# Patient Record
Sex: FEMALE | Race: BLACK OR AFRICAN AMERICAN | NOT HISPANIC OR LATINO | Employment: FULL TIME | URBAN - METROPOLITAN AREA
[De-identification: names, ages, dates, MRNs, and addresses within clinical notes are randomized per-mention and may not be internally consistent; named-entity substitution may affect disease eponyms.]

---

## 2018-03-12 ENCOUNTER — HOSPITAL ENCOUNTER (EMERGENCY)
Facility: HOSPITAL | Age: 63
Discharge: HOME | End: 2018-03-13
Attending: EMERGENCY MEDICINE
Payer: MEDICAID

## 2018-03-12 DIAGNOSIS — R07.9 CHEST PAIN: ICD-10-CM

## 2018-03-12 DIAGNOSIS — I10 ESSENTIAL HYPERTENSION: ICD-10-CM

## 2018-03-12 DIAGNOSIS — R07.89 CHEST WALL PAIN: Primary | ICD-10-CM

## 2018-03-12 LAB
ALBUMIN SERPL BCP-MCNC: 4.3 G/DL
ALP SERPL-CCNC: 77 U/L
ALT SERPL W/O P-5'-P-CCNC: 16 U/L
ANION GAP SERPL CALC-SCNC: 12 MMOL/L
AST SERPL-CCNC: 23 U/L
BASOPHILS # BLD AUTO: 0.02 K/UL
BASOPHILS NFR BLD: 0.3 %
BILIRUB SERPL-MCNC: 0.8 MG/DL
BUN SERPL-MCNC: 18 MG/DL
CALCIUM SERPL-MCNC: 10.2 MG/DL
CHLORIDE SERPL-SCNC: 102 MMOL/L
CO2 SERPL-SCNC: 28 MMOL/L
CREAT SERPL-MCNC: 0.8 MG/DL
DIFFERENTIAL METHOD: NORMAL
EOSINOPHIL # BLD AUTO: 0.2 K/UL
EOSINOPHIL NFR BLD: 2.3 %
ERYTHROCYTE [DISTWIDTH] IN BLOOD BY AUTOMATED COUNT: 14.4 %
EST. GFR  (AFRICAN AMERICAN): >60 ML/MIN/1.73 M^2
EST. GFR  (NON AFRICAN AMERICAN): >60 ML/MIN/1.73 M^2
GLUCOSE SERPL-MCNC: 96 MG/DL
HCT VFR BLD AUTO: 40.2 %
HGB BLD-MCNC: 13.1 G/DL
LYMPHOCYTES # BLD AUTO: 3.4 K/UL
LYMPHOCYTES NFR BLD: 43.7 %
MCH RBC QN AUTO: 29.6 PG
MCHC RBC AUTO-ENTMCNC: 32.6 G/DL
MCV RBC AUTO: 91 FL
MONOCYTES # BLD AUTO: 0.3 K/UL
MONOCYTES NFR BLD: 4.2 %
NEUTROPHILS # BLD AUTO: 3.9 K/UL
NEUTROPHILS NFR BLD: 49.5 %
PLATELET # BLD AUTO: 208 K/UL
PMV BLD AUTO: 9.8 FL
POTASSIUM SERPL-SCNC: 3.8 MMOL/L
PROT SERPL-MCNC: 8.2 G/DL
RBC # BLD AUTO: 4.43 M/UL
SODIUM SERPL-SCNC: 142 MMOL/L
TROPONIN I SERPL DL<=0.01 NG/ML-MCNC: <0.006 NG/ML
WBC # BLD AUTO: 7.85 K/UL

## 2018-03-12 PROCEDURE — 99284 EMERGENCY DEPT VISIT MOD MDM: CPT | Mod: 25

## 2018-03-12 PROCEDURE — 93010 ELECTROCARDIOGRAM REPORT: CPT | Mod: ,,, | Performed by: INTERNAL MEDICINE

## 2018-03-12 PROCEDURE — 84484 ASSAY OF TROPONIN QUANT: CPT

## 2018-03-12 PROCEDURE — 85025 COMPLETE CBC W/AUTO DIFF WBC: CPT

## 2018-03-12 PROCEDURE — 93005 ELECTROCARDIOGRAM TRACING: CPT

## 2018-03-12 PROCEDURE — 80053 COMPREHEN METABOLIC PANEL: CPT

## 2018-03-12 RX ORDER — NAPROXEN 375 MG/1
375 TABLET ORAL 2 TIMES DAILY WITH MEALS
Qty: 60 TABLET | Refills: 0 | Status: SHIPPED | OUTPATIENT
Start: 2018-03-12

## 2018-03-12 RX ORDER — ACETAMINOPHEN 500 MG
1000 TABLET ORAL
Status: DISCONTINUED | OUTPATIENT
Start: 2018-03-12 | End: 2018-03-13 | Stop reason: HOSPADM

## 2018-03-13 VITALS
HEART RATE: 65 BPM | TEMPERATURE: 98 F | OXYGEN SATURATION: 95 % | DIASTOLIC BLOOD PRESSURE: 98 MMHG | SYSTOLIC BLOOD PRESSURE: 157 MMHG | RESPIRATION RATE: 18 BRPM | WEIGHT: 186.75 LBS

## 2018-03-13 NOTE — ED PROVIDER NOTES
SCRIBE #1 NOTE: I, Lyssa Palumbo, am scribing for, and in the presence of, Bo Newton Do, MD. I have scribed the entire note.      History      Chief Complaint   Patient presents with    Chest Pain     intermittent chest pain x 1 week; not present at this time       Review of patient's allergies indicates:  No Known Allergies     HPI   HPI    3/12/2018, 9:44 PM   History obtained from the patient      History of Present Illness: Sonia Michele Cooks is a 62 y.o. female patient who presents to the Emergency Department for substernal CP which onset gradually 1 week ago when pt was lifting up a table. Pt states CP is exacerbated when bending down. Symptoms are intermittent and moderate in severity. No associated sxs included.  Patient denies any nausea, vomiting, diarrhea, wheezing, SOB, palpitations, abdominal pain, and all other sxs at this time. No prior Tx included. No further complaints or concerns at this time.       Arrival mode: Personal vehicle      PCP: Primary Doctor No       Past Medical History:  Unknown     Past Surgical History:  Unknown    Family History:   Unknown    Social History:  Social History     Social History Main Topics    Smoking status: Unknown    Smokeless tobacco: Unknown    Alcohol use Unknown    Drug use: Unknown    Sexual activity: Unknown       ROS   Review of Systems   Constitutional: Negative for chills, diaphoresis, fatigue and fever.   HENT: Negative for congestion, drooling, rhinorrhea, sneezing and sore throat.    Respiratory: Negative for cough, choking, shortness of breath and wheezing.    Cardiovascular: Positive for chest pain (substernal). Negative for palpitations and leg swelling.   Gastrointestinal: Negative for abdominal pain, constipation, diarrhea, nausea and vomiting.   Genitourinary: Negative for dysuria, frequency, hematuria and urgency.   Musculoskeletal: Negative for back pain, joint swelling, neck pain and neck stiffness.        (+) Breast tenderness   Skin:  Negative for rash and wound.   Neurological: Negative for dizziness, syncope, weakness, light-headedness, numbness and headaches.   All other systems reviewed and are negative.    Physical Exam      Initial Vitals [03/12/18 1946]   BP Pulse Resp Temp SpO2   (!) 167/92 62 18 98.1 °F (36.7 °C) 95 %      MAP       117          Physical Exam  Nursing Notes and Vital Signs Reviewed.  Constitutional: Patient is in no acute distress. Well-developed and well-nourished.  Head: Atraumatic. Normocephalic.  Eyes: PERRL. EOM intact. Conjunctivae are not pale. No scleral icterus.  ENT: Mucous membranes are moist. Oropharynx is clear and symmetric.    Neck: Supple. Full ROM. No lymphadenopathy.  Cardiovascular: Regular rate. Regular rhythm.  Pulmonary/Chest: No respiratory distress. Clear to auscultation bilaterally. No wheezing or rales. Substernal chest tenderness.  Abdominal: Soft and non-distended.   Musculoskeletal: Moves all extremities. No obvious deformities.   Skin: Warm and dry. No masses on breast, no nipple discharge, no puckering. Mammogram done a few months ago.  Neurological:  Alert, awake, and appropriate.  Normal speech.  No acute focal neurological deficits are appreciated.  Psychiatric: Normal affect. Good eye contact. Appropriate in content.      ED Course    Procedures  ED Vital Signs:  Vitals:    03/12/18 1946 03/13/18 0008   BP: (!) 167/92 (!) 157/98   Pulse: 62 65   Resp: 18 18   Temp: 98.1 °F (36.7 °C)    TempSrc: Oral    SpO2: 95%    Weight: 84.7 kg (186 lb 11.7 oz)        Abnormal Lab Results:  Labs Reviewed   CBC W/ AUTO DIFFERENTIAL   COMPREHENSIVE METABOLIC PANEL   TROPONIN I        All Lab Results:  Results for orders placed or performed during the hospital encounter of 03/12/18   CBC auto differential   Result Value Ref Range    WBC 7.85 3.90 - 12.70 K/uL    RBC 4.43 4.00 - 5.40 M/uL    Hemoglobin 13.1 12.0 - 16.0 g/dL    Hematocrit 40.2 37.0 - 48.5 %    MCV 91 82 - 98 fL    MCH 29.6 27.0 - 31.0 pg     MCHC 32.6 32.0 - 36.0 g/dL    RDW 14.4 11.5 - 14.5 %    Platelets 208 150 - 350 K/uL    MPV 9.8 9.2 - 12.9 fL    Gran # (ANC) 3.9 1.8 - 7.7 K/uL    Lymph # 3.4 1.0 - 4.8 K/uL    Mono # 0.3 0.3 - 1.0 K/uL    Eos # 0.2 0.0 - 0.5 K/uL    Baso # 0.02 0.00 - 0.20 K/uL    Gran% 49.5 38.0 - 73.0 %    Lymph% 43.7 18.0 - 48.0 %    Mono% 4.2 4.0 - 15.0 %    Eosinophil% 2.3 0.0 - 8.0 %    Basophil% 0.3 0.0 - 1.9 %    Differential Method Automated    Comprehensive metabolic panel   Result Value Ref Range    Sodium 142 136 - 145 mmol/L    Potassium 3.8 3.5 - 5.1 mmol/L    Chloride 102 95 - 110 mmol/L    CO2 28 23 - 29 mmol/L    Glucose 96 70 - 110 mg/dL    BUN, Bld 18 6 - 20 mg/dL    Creatinine 0.8 0.5 - 1.4 mg/dL    Calcium 10.2 8.7 - 10.5 mg/dL    Total Protein 8.2 6.0 - 8.4 g/dL    Albumin 4.3 3.5 - 5.2 g/dL    Total Bilirubin 0.8 0.1 - 1.0 mg/dL    Alkaline Phosphatase 77 55 - 135 U/L    AST 23 10 - 40 U/L    ALT 16 10 - 44 U/L    Anion Gap 12 8 - 16 mmol/L    eGFR if African American >60 >60 mL/min/1.73 m^2    eGFR if non African American >60 >60 mL/min/1.73 m^2   Troponin I #1   Result Value Ref Range    Troponin I <0.006 0.000 - 0.026 ng/mL         Imaging Results:  Imaging Results          X-Ray Chest PA And Lateral (Final result)  Result time 03/12/18 22:40:34    Final result by Elyse Dave MD (03/12/18 22:40:34)                 Impression:     No acute cardiopulmonary disease.            Electronically signed by: ELYSE DAVE MD  Date:     03/12/18  Time:    22:40              Narrative:    EXAM:   [id number] CHEST PA AND LATERAL    CLINICAL HISTORY:  Chest Pain     COMPARISON: No relevant priors    Findings:     The lungs are clear. The cardiac silhouette is within normal limits.                             The EKG was ordered, reviewed, and independently interpreted by the ED provider.  Interpretation time: 19:55  Rate: 63 BPM  Rhythm: normal sinus rhythm  Interpretation: Possible L atrial  enlargement. Septal Infarct, age undetermined. No STEMI.         The Emergency Provider reviewed the vital signs and test results, which are outlined above.    ED Discussion     11:51 PM: Reassessed pt at this time.  Pt states her condition has improved at this time. Discussed with pt all pertinent ED information and results. Discussed to pt dx and plan of tx. Gave pt all f/u and return to the ED instructions. All questions and concerns were addressed at this time. Pt expresses understanding of information and instructions, and is comfortable with plan to discharge. Pt is stable for discharge.    I have discussed with patient and/or family/caretaker chest pain precautions, specifically to return for worsening chest pain, shortness of breath, fever, or any concern.  I have low suspicion for cardiopulmonary, vascular, infectious, respiratory, or other emergent medical condition based on my evaluation in the ED.      ED Medication(s):  Medications   acetaminophen tablet 1,000 mg (1,000 mg Oral Not Given 3/12/18 5066)       Discharge Medication List as of 3/13/2018 12:13 AM      START taking these medications    Details   naproxen (NAPROSYN) 375 MG tablet Take 1 tablet (375 mg total) by mouth 2 (two) times daily with meals., Starting Mon 3/12/2018, Print             Follow-up Information     [facility] In 2 days.    Contact information:  [address] [address]                   Medical Decision Making    Medical Decision Making:   Clinical Tests:   Lab Tests: Reviewed and Ordered  Radiological Study: Ordered and Reviewed  Medical Tests: Ordered and Reviewed           Scribe Attestation:   Scribe #1: I performed the above scribed service and the documentation accurately describes the services I performed. I attest to the accuracy of the note.    Attending:   Physician Attestation Statement for Scribe #1: I, Bo Newton Do, MD, personally performed the services described in this  documentation, as scribed by Lyssa Palumbo, in my presence, and it is both accurate and complete.          Clinical Impression       ICD-10-CM ICD-9-CM   1. Chest wall pain R07.89 786.52   2. Chest pain R07.9 786.50   3. Essential hypertension I10 401.9       Disposition:   Disposition: Discharged  Condition: Stable         Bo Newton Do, MD  03/13/18 0318